# Patient Record
Sex: FEMALE | URBAN - METROPOLITAN AREA
[De-identification: names, ages, dates, MRNs, and addresses within clinical notes are randomized per-mention and may not be internally consistent; named-entity substitution may affect disease eponyms.]

---

## 2024-05-02 ENCOUNTER — TELEPHONE (OUTPATIENT)
Dept: UROLOGY | Facility: CLINIC | Age: 44
End: 2024-05-02

## 2024-05-06 NOTE — TELEPHONE ENCOUNTER
LVM for patient that we cancelled her appt w/ Dr. Gamino on 7/10.  3 attempts were made to obtain the ins info from the patient, and she did not call back.  Asked her to please call back when she has her insurance information, and we will reschedule her.

## 2024-06-19 ENCOUNTER — TELEPHONE (OUTPATIENT)
Dept: UROLOGY | Facility: CLINIC | Age: 44
End: 2024-06-19

## 2024-06-19 NOTE — TELEPHONE ENCOUNTER
Left reminder message about upcoming new patient appointment with Dr Gamino on 6/26/24 @ 3p. Reminded patient of our [place] address and to bring with new patient paperwork, photo id and insurance card. Also left office phone number in case patient needs to call us back.

## 2024-06-26 ENCOUNTER — OFFICE VISIT (OUTPATIENT)
Dept: UROLOGY | Facility: CLINIC | Age: 44
End: 2024-06-26
Attending: OBSTETRICS & GYNECOLOGY

## 2024-06-26 VITALS
DIASTOLIC BLOOD PRESSURE: 52 MMHG | RESPIRATION RATE: 18 BRPM | WEIGHT: 140.38 LBS | HEIGHT: 63.5 IN | BODY MASS INDEX: 24.57 KG/M2 | SYSTOLIC BLOOD PRESSURE: 92 MMHG | TEMPERATURE: 98 F

## 2024-06-26 DIAGNOSIS — N94.6 DYSMENORRHEA: ICD-10-CM

## 2024-06-26 DIAGNOSIS — N81.2 INCOMPLETE UTEROVAGINAL PROLAPSE: Primary | ICD-10-CM

## 2024-06-26 DIAGNOSIS — N92.0 MENORRHAGIA WITH REGULAR CYCLE: ICD-10-CM

## 2024-06-26 DIAGNOSIS — N39.3 FEMALE STRESS INCONTINENCE: ICD-10-CM

## 2024-06-26 DIAGNOSIS — N81.11 CYSTOCELE, MIDLINE: ICD-10-CM

## 2024-06-26 DIAGNOSIS — N81.6 RECTOCELE: ICD-10-CM

## 2024-06-26 PROCEDURE — 99212 OFFICE O/P EST SF 10 MIN: CPT

## 2024-06-26 RX ORDER — NORETHINDRONE ACETATE AND ETHINYL ESTRADIOL .02; 1 MG/1; MG/1
TABLET ORAL
COMMUNITY
Start: 2024-05-15 | End: 2025-05-15

## 2024-06-26 NOTE — PROGRESS NOTES
ID: Gail Block  : 1980  Date: 2024     Referred by Dr. Teague    Chief Complaint   Patient presents with    Prolapse     Ana, uui, planning hysterectomy for menorrhagia, and fibroids.       HPI:  43 year old female, G5, Vaginal deliveries x4, who presents for evaluation of urinary leaking which is longstanding.  Leas with running, coughing, sneezing, exercise.   Has frequency and nocturia.  Some UUI.  No prolapse symptoms.  No UTIs.  Had normal UA on 3/4/24.   Has been having heavy and painful menses. Pelvic MRI \"3.2 x 2.7 x 1.6 cm homogeneously hypointense structure  located posterior and lateral to the uterine fundus on the right, corresponding to hyperechoic and  shadowing mass seen in recent pelvic ultrasound of 2024. This lesion shows overall isointensity  in the fat saturated precontrast T1-weighted sequences without subsequent contrast enhancement. No  definite signal alteration based on diffusion-weighted sequences (DWI). Given the MRI and  sonographic characteristics, this lesion may represent a densely calcified pedunculated leiomyoma or  other benign pelvic mass of uncertain etiology\"  History of cervical dysplasia  Sees Dr. Teague and is planning hysterectomy  Had prolapse noted on exam with gyn.  Feels \"too much laxity\". Had 4th degree laceration at the time of forceps delivery and feels vaginal introitus is too wide.   Has coital incontinence.  No dyspareunia.  Currently on OCPs  Healthy otherwise  Works as a teacher. Would like to have surgery ASAP.    Urogynecology Summary:  Urogynecology Summary  Prolapse: No  ANA: Yes  Urge Incontinence: Yes  Nocturia Frequency: Greater than 4 (3-4)  Frequency: 1 - 2 hours  Incomplete emptying: Yes  Constipation: No  Wears pad day?: 1 (liner)  Wears Pad Night?: 1 (liner)  Activities are limited by UI/POP?: Yes (leaking)  Currently Sexually Active: Yes (leaks during intercourse)          HISTORY:  Past Medical History:    Impaired vision in both  eyes    Irritable bowel syndrome      Past Surgical History:   Procedure Laterality Date            No family history on file.   Social History     Socioeconomic History    Marital status:    Tobacco Use    Smoking status: Former     Current packs/day: 0.00     Types: Cigarettes     Quit date: 2020     Years since quittin.0    Smokeless tobacco: Never   Substance and Sexual Activity    Alcohol use: Never    Drug use: Never     Social Determinants of Health     Financial Resource Strain: Not on File (2023)    Received from [employer]     Financial Resource Strain     Financial Resource Strain: 0   Food Insecurity: Not on File (2023)    Received from [employer]     Food Insecurity     Food: 0   Transportation Needs: Not on File (2023)    Received from [employer]     Transportation Needs     Transportation: 0   Physical Activity: Not on File (2023)    Received from [employer]     Physical Activity     Physical Activity: 0   Stress: Not on File (2023)    Received from [employer]     Stress     Stress: 0   Social Connections: Not on File (2023)    Received from [employer]     Social Connections     Social Connections and Isolation: 0   Housing Stability: Not on File (2023)    Received from [employer]     Housing Stability     Housin        Allergies:  No Known Allergies    Medications:  Outpatient Medications Prior to Visit   Medication Sig Dispense Refill    Norethindrone Acet-Ethinyl Est 1-20 MG-MCG Oral Tab One tablet daily continuous dosing without taking hormone free week       No facility-administered medications prior to visit.       Review of Systems:    A comprehensive 12 point review of systems was completed.  Pertinent positives noted in the the HPI.  Denies CP  Denies SOB    Vitals:  BP 92/52   Temp 98.2 °F (36.8 °C) (Temporal)   Resp 18   Ht 63.5\"   Wt 140 lb 6.4 oz (63.7 kg)   BMI 24.48 kg/m²        GENERAL EXAM:  GENERAL:  Alert and oriented. Well-nourished, normally developed.   Thought and emotional status are appropriate, speech is understandable.  No acute distress.   HEAD: Normocephalic and atraumatic with normal hair distribution  LUNGS:  Normal respiratory effort.    ABDOMEN: Non tender to palpation, tone normal without rigidity or guarding, no masses present, no evidence of hernia.   EXTREMITIES:  Without edema, varicosities or lesions.   SKIN:  Warm and dry, with good color and turgor. No lesions.    PELVIC EXAM:  External Genitalia: Normal appearance for age. No atrophy, no lesions  Urethra: no atrophy, non  tender  Bladder:no fullness, non tender  Vagina: no atrophy, no lesions   Cervix: no bleeding, no lesions, non tender  Uterus: soft, mobile, +tender  Adnexa:no masses, non tender  Perineum: non tender  Anus: no hemorrhoids  Rectum: deferred.     PELVIS FLOOR NEUROMUSCULAR FUNCTION:  Strength:  3  Perineal Sensation:  Normal      PELVIC SUPPORT:  [place]:  1  Ant:  2  Post:  2  CST:  negative  UVJ: + hypermobile (cystourethrocele).    PELVIC ORGAN PROLAPSE ASSESSMENT SCALE (POP-Q):      Aa: 0 Ba: 0 C: -5   gh: 4.5 pb: 3 tvl: 9   Ap: 0 Bp: 0 D: -7         Impression/Plan:    ICD-10-CM    1. Incomplete uterovaginal prolapse  N81.2       2. Cystocele, midline  N81.11       3. Rectocele  N81.6       4. Female stress incontinence  N39.3       5. Menorrhagia with regular cycle  N92.0       6. Dysmenorrhea  N94.6           Discussion Items:   Nonsurgical and surgical treatments for Stress Urinary Incontinence  Nonsurgical and surgical treatments for POP  Discussed dietary and behavioral modification, discussed pharmacologic and nonpharmacologic mgmt options for urinary symptoms. Discussed dietary & weight management with potential improvements in symptoms with weight loss.    Diagnostic Items:  Urodynamics    Medications Discussed:  None    Treatment Plan, Non-surgical:   None    Treatment Plan, Surgical:   Interested in definitive surgical therapy.  Can be approached vaginally with hyst  (Dr. Teague), followed by repairs, sling. Agrees to proceed with preop urodynamic testing.     Pt verbalizes understanding of all above discussed information. Follow up after testing (phone visit OK).   information provided.       Natasha Gamino MD, FACOG, FACS  Female Pelvic Medicine and  Reconstructive Surgery (Urogynecology)  [phone] (Pager)

## 2024-06-28 ENCOUNTER — TELEPHONE (OUTPATIENT)
Dept: UROLOGY | Facility: CLINIC | Age: 44
End: 2024-06-28

## 2024-07-02 ENCOUNTER — APPOINTMENT (OUTPATIENT)
Dept: ADMINISTRATIVE | Facility: HOSPITAL | Age: 44
End: 2024-07-02
Payer: COMMERCIAL

## 2024-07-08 ENCOUNTER — TELEPHONE (OUTPATIENT)
Dept: UROLOGY | Facility: CLINIC | Age: 44
End: 2024-07-08

## 2024-07-09 ENCOUNTER — OFFICE VISIT (OUTPATIENT)
Dept: UROLOGY | Facility: CLINIC | Age: 44
End: 2024-07-09
Attending: OBSTETRICS & GYNECOLOGY
Payer: COMMERCIAL

## 2024-07-09 DIAGNOSIS — N81.6 RECTOCELE: ICD-10-CM

## 2024-07-09 DIAGNOSIS — N39.3 FEMALE STRESS INCONTINENCE: ICD-10-CM

## 2024-07-09 DIAGNOSIS — N81.2 INCOMPLETE UTEROVAGINAL PROLAPSE: Primary | ICD-10-CM

## 2024-07-09 DIAGNOSIS — N81.11 CYSTOCELE, MIDLINE: ICD-10-CM

## 2024-07-09 LAB
BLOOD URINE: NEGATIVE
CONTROL RUN WITHIN 24 HOURS?: YES
LEUKOCYTE ESTERASE URINE: NEGATIVE
NITRITE URINE: NEGATIVE

## 2024-07-09 PROCEDURE — 51797 INTRAABDOMINAL PRESSURE TEST: CPT

## 2024-07-09 PROCEDURE — 51741 ELECTRO-UROFLOWMETRY FIRST: CPT

## 2024-07-09 PROCEDURE — 51784 ANAL/URINARY MUSCLE STUDY: CPT

## 2024-07-09 PROCEDURE — 81002 URINALYSIS NONAUTO W/O SCOPE: CPT

## 2024-07-09 PROCEDURE — 51729 CYSTOMETROGRAM W/VP&UP: CPT

## 2024-07-09 NOTE — PATIENT INSTRUCTIONS
[facility]  [address] [address]  PH: [phone]  FAX: [phone]       Urodynamic Testing Discharge Instructions:    There are NO dietary or activity restrictions.  You may resume your normal schedule.      You may have mild discomfort for a few hours after your testing today.  There may be some mild burning when you urinate or you may see some blood in your urine.  These problems should not last more than 24 hours.  The following suggestions may minimize any symptoms you experience.    Drink 6-8 large glasses of water over the next 8 hours  A compress or sitz bath may be soothing  Tylenol or Ibuprofen may be taken as needed    If you experience any of the following, please call the office or, if after hours, the on-call physician at [phone].    Excessive pain  Bright red bloody discharge  Fever or chills  Continued urgency, frequency or burning with urination    Obtaining Test Results    Your urodynamic test will be interpreted by a specialist and available to the referring physician within 7-14 days.  Patients in our clinic are given an appointment to come back to discuss the results and any appropriate treatment recommendations.    Please do not hesitate to contact our office with any questions or concerns at [phone].    I acknowledge that I have received verbal and written discharge  instructions and that I understand these instructions clearly.    Patient Signature:    Date:    [facility]     Post-Operative Guidelines    AVOID CONSTIPATION - Take Miralax: one capful in water or juice each morning.  You can also take each evening if needed.  No lifting over 10 lbs. (1 gallon of milk is 8 lbs.)  It is okay to walk up and down stairs and to walk outside, but be careful not to become fatigued.  Showers and tub baths are okay, even if you have a catheter or abdominal incision.  You may ride in a  car immediately.  You may drive after 1-2 weeks.    Please call us for any of the following:  Temperature above 100.5 for 4 hours or above 101.0 at any time  Chest pain or trouble breathing  Vaginal bleeding heavier than a period  Redness, tenderness or swelling of your legs  Pain or burning when you urinate  Redness, pain or foul discharge from incision          [facility]    HAVING A URINARY CATHETER AFTER SURGERY    What is a urinary catheter?  A catheter is a soft tube used to drain urine from your bladder.    Why do I need a catheter?  A urinary catheter is used to help with healing immediately after surgery.  It works to keep your bladder empty while you are recovering.  Surgery, swelling and anesthesia can cause the bladder to lose its normal sensations for a while.  The catheter may stay in place for a week or more after you go home.    Where will I go to get the catheter removed?  Your catheter will be removed in the office.  Please call the office to schedule a voiding trial with the nurse.    How will the catheter be removed?  A nurse will disconnect your catheter from the drainage bag.  She will attach a syringe to the catheter and fill the bladder with sterile water until you have a strong desire to urinate.  The catheter will be removed and you will be able to go to the bathroom to empty your bladder.    Will the catheter need to be replaced?  You will need to urinate a specific amount, depending on how much you were initially able to hold.  Your catheter will only need to be replaced if you are not able to empty the specific amount.  If the catheter is replaced, your nurse will discuss with you when you need to return.    What do I do after the catheter is removed?  For the first 24 hours, you should go to the bathroom with your first urge or every 2-3 hours while you are awake.  Urinate before you go to bed and if you wake up in the night, you do not need to  set an alarm to wake up.  Drink plenty of water (6-8 glasses) slowly throughout the day.  Avoid liquids containing caffeine.  Continue with any pain medications (Motrin) as directed by the nurse.  Continue with your current bowel regime; avoid constipation.    What if I have trouble emptying my bladder?  If you are having trouble emptying your bladder, try the following tips:  Urinate normally then stand up, walk around for a minute or two, sit back down on the commode and attempt to urinate (double void).  Sit in a tub of warm water and try to urinate in the tub.  Run warm water over your vaginal area while attempting to urinate.    When should I call the office?  If you are unable to urinate for 6 hours.  You feel you need to urinate but cannot.  Urinating frequently in small amounts.  You experience abdominal bloating, pressure or back pain.  You have a fever over 100.5 for 4 hours or above 101.0 anytime.  You have nausea and/or vomiting.  Burning/pain with urination.    Please feel free to call the nurse at [phone] with any questions 8am-4:30pm Monday-Friday.    If the office is closed, you may call the on call physician at [phone] or go to the emergency room.

## 2024-07-09 NOTE — PROCEDURES
Patient here for urodynamic testing.  Procedure explained and confirmed by patient.  See evaluation form for results.  Both verbal and written discharge instructions were given.  Patient tolerated procedure well and will follow up with Dr. Natasha Gamino on phone on 24.    URODYNAMIC EVALUATION    PATIENT HISTORY:    Prolapse:  Yes  ANA:  Yes- reports that it is not often   UUI:  Yes - feels urgency and pressure on bladder , pelvic area - always \" feels like she has to go\"  Nocturia:  3 to 4 - wakes up with ure to urinate   Frequency:  every 1-2 hours  Sense of Incomplete Emptying:  No  Constipation:  No  Last void prior to UDS testin hours  Current urge to void?  Moderate  OAB meds stopped prior to test?  NA  Other symptoms?      Surgery?  []  No  []  Interested in surgery:   [x]  Yes, specify date: 24 TOTAL VAGINIAL HYSTERECTOMY, POSSIBLE BILATERAL SALPINGECTOMY, UTEROSACRAL LIGAMENT SUSPENSION, ANTERIOR POSTERIOR ENTEROCELE REPAIR, PLACEMENT OF MID URETHRAL SLING, CYSTOSCOPY     Surgical folder provided?  [x]  Yes IUG consent signed  at UDS []  No     PATIENT DIAGNOSIS:  Cystocele, Midline N81.11, Rectocele, Midline R15.9, Stress Incontinence N39.3, and Uterovaginal Prolapse N81.2 (incomplete)    UDS PROCEDURAL FINDINGS:  Urethral Hypermobility N36.41    MEDICATION:   No outpatient medications have been marked as taking for the 24 encounter (Office Visit) with LIS PROCEDURE.        ALLERGIES:  Patient has no known allergies.      EXAM:  Urinalysis Dip:  No visits with results within 1 Day(s) from this visit.   Latest known visit with results is:   No results found for any previous visit.      Urovesico [place] ( >30 degrees ):  [x]  Mobile  []  Fixed    Perineal Sensation:  [x]  Normal  []  Abnormal    Additional Notes:    PROLAPSE:  [x]  Yes  []  No  Prolapse reduced for testing?  [x]  Yes  []  No  []  Pessary  []  Manual  [x]  Half Speculum    Additional Notes:    UROFLOWMETRY:   Unreduced  Voided Volume:                      277       mL  Maximum Flow Rate:                         26       mL/sec  Average flow rate:                       13      mL/sec  Post-void Residual:                      40     mL  Pattern:  [x]  Normal  []  Poor flow     []  Intermittent  []  Other  Void:   [x]  Typical  []  Atypical    Additional Notes:    CYSTOMETRY:  Urethral Catheter:  Fr 7 / tdoc  Abd Catheter:     Fr 7 / tdoc   Infusion:  Water Rate 30 mL/min  Temp:  Room  Position:  [x]  Sit  []  Stand  []  Supine  First sensation:   78 mL  First desire to void:   115 mL  Strong desire to void:  275 mL  Maximum cystometric capacity:   315 mL uncomfortable with fill at halfway  Detrusor Activity:  []  Unstable   [x]  Stable  Urge leakage?    []  Yes [x]  No  Volume at 1st unhibited detrusor cont:    mL  Detrusor instability provoked by:    []  Spontaneous []  Coughing  []  Filling  []  Valsalva  []  Other    Additional Notes:      URETHRAL FUNCTION:  Valsava (vesical) Leak Point Pressures:    Volume Leak Point Pressure Leak?    Cough Valsalva      100mL 143 37    cm H2O []  Yes [x]  No         REDUCED:Half Speculum  Volume Leak Point Pressure Leak?    Cough Valsalva      100mL 143 35    cm H2O []  Yes [x]  No   200mL 149 35    cm H2O []  Yes [x]  No   275 mL 196 125    cm H2O []  Yes [x]  No   Maximum Cystometric Capacity  316 ml  Sitting upright 179 107 cm H2O []  Yes [x]  No     Genuine Stress Incontinence demonstrated?   []  Yes  [x]  No    Resting Urethral Pressure Profile:     Functional Urethral Length: manual pull  Maximum UCP:          110  cm          129    cm             119     cm    PRESSURE/FLOW STUDY:  Unreduced  Voided volume:       407 mL  Maximum flow rate:       10 mL/sec  Pressure Detrusor (at maximum flow):         48   cm H2O  Post void residual:        20       mL  Voiding mechanism:  [x]  Abnormal  []  Normal  [x]  Strain to void   []  Weak detrusor  Void:   [x]  Typical   []   Atypical    Additional Notes:  Uroflowmetry, cystometry, and pressure / flow study were completed using calibrated electronic equipment and air charged transducers.    EMG:  During fill: Reactive    During flow study: Reactive strain to void    7/9/2024 10:33 AM     PERFORMED BY:  Beckie SALVADRO RN

## 2024-07-16 ENCOUNTER — LAB ENCOUNTER (OUTPATIENT)
Dept: LAB | Age: 44
End: 2024-07-16
Attending: OBSTETRICS & GYNECOLOGY
Payer: COMMERCIAL

## 2024-07-16 DIAGNOSIS — N81.4 UTEROVAGINAL PROLAPSE: ICD-10-CM

## 2024-07-16 LAB
ERYTHROCYTE [DISTWIDTH] IN BLOOD BY AUTOMATED COUNT: 12.3 %
HCT VFR BLD AUTO: 41.3 %
HGB BLD-MCNC: 13.7 G/DL
MCH RBC QN AUTO: 31.1 PG (ref 26–34)
MCHC RBC AUTO-ENTMCNC: 33.2 G/DL (ref 31–37)
MCV RBC AUTO: 93.9 FL
PLATELET # BLD AUTO: 199 10(3)UL (ref 150–450)
RBC # BLD AUTO: 4.4 X10(6)UL
WBC # BLD AUTO: 7 X10(3) UL (ref 4–11)

## 2024-07-16 PROCEDURE — 36415 COLL VENOUS BLD VENIPUNCTURE: CPT

## 2024-07-16 PROCEDURE — 85027 COMPLETE CBC AUTOMATED: CPT

## 2024-07-17 ENCOUNTER — VIRTUAL PHONE E/M (OUTPATIENT)
Dept: UROLOGY | Facility: CLINIC | Age: 44
End: 2024-07-17
Attending: OBSTETRICS & GYNECOLOGY
Payer: COMMERCIAL

## 2024-07-17 DIAGNOSIS — N81.6 RECTOCELE: ICD-10-CM

## 2024-07-17 DIAGNOSIS — N81.2 INCOMPLETE UTEROVAGINAL PROLAPSE: Primary | ICD-10-CM

## 2024-07-17 DIAGNOSIS — N81.11 CYSTOCELE, MIDLINE: ICD-10-CM

## 2024-07-17 DIAGNOSIS — N39.3 FEMALE STRESS INCONTINENCE: ICD-10-CM

## 2024-07-17 DIAGNOSIS — N92.0 MENORRHAGIA WITH REGULAR CYCLE: ICD-10-CM

## 2024-07-17 DIAGNOSIS — N94.6 DYSMENORRHEA: ICD-10-CM

## 2024-07-17 NOTE — PROGRESS NOTES
ID: Gail Block  : 1980  Date: 24    This is a virtual/phone visit being conducted with patient's consent.  History collected via telephone.  MD interview conduction via telephone  The patient has provided verbal understanding of the co-insurance liability for the telephonic/video provider services.  Length of services provided: 20 minutes.       No chief complaint on file.      HPI:  43 year old female, G5, Vaginal deliveries x4, who was originally seen on 24. To summarize, she presented for evaluation of urinary leaking which is longstanding.  Leas with running, coughing, sneezing, exercise.   Has frequency and nocturia.  Some UUI.  No prolapse symptoms.  No UTIs.  Had normal UA on 3/4/24.   Has been having heavy and painful menses. Pelvic MRI \"3.2 x 2.7 x 1.6 cm homogeneously hypointense structure located posterior and lateral to the uterine fundus on the right, corresponding to hyperechoic and shadowing mass seen in recent pelvic ultrasound of 2024. This lesion shows overall isointensity  in the fat saturated precontrast T1-weighted sequences without subsequent contrast enhancement. No  definite signal alteration based on diffusion-weighted sequences (DWI). Given the MRI and  sonographic characteristics, this lesion may represent a densely calcified pedunculated leiomyoma or  other benign pelvic mass of uncertain etiology\"  History of cervical dysplasia. Status post LEEP in the past.   Had prolapse noted on exam with gyn.  Feels \"too much laxity\". Had 4th degree laceration at the time of forceps delivery and feels vaginal introitus is too wide.   Has coital incontinence.  No dyspareunia.  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    Initial urogyn exam demonstrated stage 2 cystocele, rectocele with uterine descent. + hypermobility. Interested in surgery. This is her follow up encounter after UDS.    Interval history:  Urodynamic testing  undergone without complication on 7/9/24.  Results reviewed with patient  277 ml void (40 ml PVR)  315 ml capacity (+sensory urgency).   Stable detrusor  No urodynamic stress urinary incontinence with and without prolapse reduced.   Pressure/flow study: Voided 407 ml with PVR of 20 ml.        Review of Systems:    A comprehensive 12 point review of systems was completed.  Pertinent positives noted in the the HPI.  Denies CP  Denies SOB    Vitals:  Unable to perform vitals or do physical as this is a virtual visit.  The patient sounds alert and oriented x 3, no acute distress. The patient is speaking in complete sentences without any conversational dyspnea or respiratory distress.        Impression:    ICD-10-CM    1. Incomplete uterovaginal prolapse  N81.2       2. Cystocele, midline  N81.11       3. Menorrhagia with regular cycle  N92.0       4. Dysmenorrhea  N94.6       5. Rectocele  N81.6       6. Female stress incontinence  N39.3             Plan:  Discussed with patient management options for her symptoms. She is bothered by prolapse symptoms, ANA, abnormal bleeding, dysmenorrhea.  Wants to proceed with definitive surgical therapy. Discussed findings on UDS and inability to document ANA. However, she has ANA symptoms and hypermobility on exam. Recommend to have sling done. She can be approached vaginally with TVH, possible BS, USLS, APE repairs, sling, cysto.     Thorough discussion of surgical risks, benefits, and alternatives including, but not limited to bleeding/clots, infection, injury to nearby organs (urethra, bladder, ureters, bowel, blood vessels), mesh erosion/exposure, persistent dyspareunia, worsening UUI, recurrent ANA, prolapse recurrence, voiding dysfunction, and pain. Possible need for additional surgeries to address any complications reviewed. Discussed pain mgmt and potential need for narcotics. Discussed addiction potential with narcotics. [place]  reviewed.  We reviewed hospital stay and  postoperative convalescence.  She understands she will need to go home with a catheter.    All questions answered. Pre-operative instructions reviewed. IULTD consent signed at last visit. She has been scheduled for surgery on 7/22/24 at [facility].       Dr. Natasha Gamino MD, FACOG, FACS  Female Pelvic Medicine and  Reconstructive Surgery (Urogynecology)  [phone] (Pager)

## 2024-07-21 NOTE — H&P
[facility] INPATIENT H&P   GYN ADMISSION NOTE    ADMISSION DATE:  7/22/24  ADMITTING PHYSICIAN:  Natasha Gamino MD  ATTENDING PHYSICIAN:  Natasha Gamino MD  PRIMARY CARE PHYSICIAN:  No primary care provider on file.  CODE STATUS:  No Order    CHIEF COMPLAINT/REASON FOR ADMISSION:  Vaginal prolapse, abnormal bleeding, incontinence.     HISTORY OF PRESENT ILLNESS:    Gail Block is a 43 year old,  G5, Vaginal deliveries x4, who presented for evaluation of urinary leaking which is longstanding.  Leas with running, coughing, sneezing, exercise.   Has frequency and nocturia.  Some UUI.  No prolapse symptoms.  No UTIs.  Had normal UA on 3/4/24.   Has been having heavy and painful menses. Pelvic MRI \"3.2 x 2.7 x 1.6 cm homogeneously hypointense structure located posterior and lateral to the uterine fundus on the right, corresponding to hyperechoic and shadowing mass seen in recent pelvic ultrasound of 6/7/2024. This lesion shows overall isointensity  in the fat saturated precontrast T1-weighted sequences without subsequent contrast enhancement. No  definite signal alteration based on diffusion-weighted sequences (DWI). Given the MRI and  sonographic characteristics, this lesion may represent a densely calcified pedunculated leiomyoma or  other benign pelvic mass of uncertain etiology\"  History of cervical dysplasia. Status post LEEP in the past.   Had prolapse noted on exam with gyn.  Feels \"too much laxity\". Had 4th degree laceration at the time of forceps delivery and feels vaginal introitus is too wide.   Has coital incontinence.  No dyspareunia.  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     Initial urogyn exam demonstrated stage 2 cystocele, rectocele with uterine descent. + hypermobility. Interested in surgery.     Had Urodynamic testing on 7/9/24. Had 315 ml capacity (+sensory urgency). Stable detrusor. No urodynamic stress urinary incontinence with and  without prolapse reduced.  Pressure/flow study: Voided 407 ml with PVR of 20 ml.     MEDICATIONS PRIOR TO ADMISSION:      Current Outpatient Medications:     Norethindrone Acet-Ethinyl Est 1-20 MG-MCG Oral Tab, One tablet daily continuous dosing without taking hormone free week, Disp: , Rfl:        ALLERGIES:    No Known Allergies    PAST MEDICAL HISTORY:    Past Medical History:    Anxiety state    Attention deficit hyperactivity disorder (ADHD)    Cancer (HCC)    Cervical cancer (HCC)    Depression    IBS (irritable bowel syndrome)    Impaired vision in both eyes    Irritable bowel syndrome    Visual impairment    contacts and glasses       SURGICAL HISTORY:    Past Surgical History:   Procedure Laterality Date    Colonoscopy               FAMILY HISTORY:    History reviewed. No pertinent family history.    SOCIAL HISTORY:    Social History     Socioeconomic History    Marital status:    Tobacco Use    Smoking status: Former     Current packs/day: 0.00     Types: Cigarettes     Quit date: 2020     Years since quittin.0    Smokeless tobacco: Never   Vaping Use    Vaping status: Never Used   Substance and Sexual Activity    Alcohol use: Never    Drug use: Never     Social Determinants of Health     Financial Resource Strain: Not on File (2023)    Received from [facility]    Financial Resource Strain     Financial Resource Strain: 0   Food Insecurity: Not on File (2023)    Received from [facility]    Food Insecurity     Food: 0   Transportation Needs: Not on File (2023)    Received from [facility]    Transportation Needs     Transportation: 0   Physical Activity: Not on File (2023)    Received from [facility]    Physical Activity     Physical Activity: 0   Stress: Not on File (2023)    Received from [facility]    Stress     Stress: 0   Social Connections: Not on File (2023)    Received from [facility]    Social Connections     Social Connections and Isolation: 0   Housing Stability: Not on File  (2023)    Received from [facility]    [facility] Stability     Housin       REVIEW OF SYSTEMS:    As per HPI. Denies CP or SOB    OBJECTIVE:  VITAL SIGNS:    Ht 64\"   Wt 138 lb (62.6 kg)   LMP 2024 (Approximate)   BMI 23.69 kg/m²      PHYSICAL EXAMINATION:  GENERAL EXAM:  GENERAL:  Alert and oriented. Well-nourished, normally developed.  Thought and emotional status are appropriate, speech is understandable.  No acute distress.   HEAD: Normocephalic and atraumatic with normal hair distribution  LUNGS:  Normal respiratory effort.    ABDOMEN: Non tender to palpation, tone normal without rigidity or guarding, no masses present, no evidence of hernia.   EXTREMITIES:  Without edema, varicosities or lesions.   SKIN:  Warm and dry, with good color and turgor. No lesions.     PELVIC EXAM:  External Genitalia: Normal appearance for age. No atrophy, no lesions  Urethra: no atrophy, non  tender  Bladder:no fullness, non tender  Vagina: no atrophy, no lesions   Cervix: no bleeding, no lesions, non tender  Uterus: soft, mobile, +tender  Adnexa:no masses, non tender  Perineum: non tender  Anus: no hemorrhoids  Rectum: deferred.      PELVIS FLOOR NEUROMUSCULAR FUNCTION:  Strength:  3  Perineal Sensation:  Normal        PELVIC SUPPORT:  [place]:  1  Ant:  2  Post:  2  CST:  negative  UVJ: + hypermobile (cystourethrocele).     PELVIC ORGAN PROLAPSE ASSESSMENT SCALE (POP-Q):                                          Aa: 0 Ba: 0 C: -5   gh: 4.5 pb: 3 tvl: 9   Ap: 0 Bp: 0 D: -7           ASSESSMENT:    Uterovaginal prolapse, cystocele, rectocele  Stress urinary incontinence  Menorrhagia, dysmenorreha    PLAN:    Discussed with patient management options for her symptoms. She is bothered by prolapse symptoms, ANA, abnormal bleeding, dysmenorrhea.  Wants to proceed with definitive surgical therapy. Discussed findings on UDS and inability to document ANA. However, she has ANA symptoms and hypermobility on exam. Recommend to have  sling done. She can be approached vaginally with TVH, possible BS, USLS, APE repairs, sling, cysto.      Thorough discussion of surgical risks, benefits, and alternatives including, but not limited to bleeding/clots, infection, injury to nearby organs (urethra, bladder, ureters, bowel, blood vessels), mesh erosion/exposure, persistent dyspareunia, worsening UUI, recurrent ANA, prolapse recurrence, voiding dysfunction, and pain. Possible need for additional surgeries to address any complications reviewed. Discussed pain mgmt and potential need for narcotics. Discussed addiction potential with narcotics. IL  reviewed.  We reviewed hospital stay and postoperative convalescence.  She understands she will need to go home with a catheter.     All questions answered. Pre-operative instructions reviewed. IULTD consent signed at last visit. She has been scheduled for surgery on 7/22/24 at [facility].        Natasha Gamino MD   7/21/2024

## 2024-07-22 ENCOUNTER — ANESTHESIA EVENT (OUTPATIENT)
Dept: SURGERY | Facility: HOSPITAL | Age: 44
End: 2024-07-22
Payer: COMMERCIAL

## 2024-07-22 ENCOUNTER — ANESTHESIA (OUTPATIENT)
Dept: SURGERY | Facility: HOSPITAL | Age: 44
End: 2024-07-22
Payer: COMMERCIAL

## 2024-07-22 ENCOUNTER — HOSPITAL ENCOUNTER (OUTPATIENT)
Facility: HOSPITAL | Age: 44
Discharge: HOME | End: 2024-07-23
Attending: OBSTETRICS & GYNECOLOGY | Admitting: OBSTETRICS & GYNECOLOGY
Payer: COMMERCIAL

## 2024-07-22 DIAGNOSIS — Z98.890 POSTOPERATIVE STATE: ICD-10-CM

## 2024-07-22 DIAGNOSIS — N81.4 UTEROVAGINAL PROLAPSE: Primary | ICD-10-CM

## 2024-07-22 LAB — B-HCG UR QL: NEGATIVE

## 2024-07-22 PROCEDURE — 0UT97ZZ RESECTION OF UTERUS, VIA NATURAL OR ARTIFICIAL OPENING: ICD-10-PCS | Performed by: OBSTETRICS & GYNECOLOGY

## 2024-07-22 PROCEDURE — 0USG0ZZ REPOSITION VAGINA, OPEN APPROACH: ICD-10-PCS | Performed by: OBSTETRICS & GYNECOLOGY

## 2024-07-22 PROCEDURE — 0JQC0ZZ REPAIR PELVIC REGION SUBCUTANEOUS TISSUE AND FASCIA, OPEN APPROACH: ICD-10-PCS | Performed by: OBSTETRICS & GYNECOLOGY

## 2024-07-22 PROCEDURE — 81025 URINE PREGNANCY TEST: CPT

## 2024-07-22 PROCEDURE — 88305 TISSUE EXAM BY PATHOLOGIST: CPT | Performed by: OBSTETRICS & GYNECOLOGY

## 2024-07-22 PROCEDURE — 0UT77ZZ RESECTION OF BILATERAL FALLOPIAN TUBES, VIA NATURAL OR ARTIFICIAL OPENING: ICD-10-PCS | Performed by: OBSTETRICS & GYNECOLOGY

## 2024-07-22 PROCEDURE — 0TSD0ZZ REPOSITION URETHRA, OPEN APPROACH: ICD-10-PCS | Performed by: OBSTETRICS & GYNECOLOGY

## 2024-07-22 DEVICE — TRANSVAGINAL MID-URETHRAL SLING
Type: IMPLANTABLE DEVICE | Site: VAGINA | Status: FUNCTIONAL
Brand: ADVANTAGE FIT™ BLUE SYSTEM

## 2024-07-22 RX ORDER — PROCHLORPERAZINE EDISYLATE 5 MG/ML
5 INJECTION INTRAMUSCULAR; INTRAVENOUS EVERY 8 HOURS PRN
Status: DISCONTINUED | OUTPATIENT
Start: 2024-07-22 | End: 2024-07-22 | Stop reason: HOSPADM

## 2024-07-22 RX ORDER — IBUPROFEN 200 MG
600 TABLET ORAL ONCE AS NEEDED
Status: DISCONTINUED | OUTPATIENT
Start: 2024-07-22 | End: 2024-07-22 | Stop reason: HOSPADM

## 2024-07-22 RX ORDER — KETOROLAC TROMETHAMINE 30 MG/ML
INJECTION, SOLUTION INTRAMUSCULAR; INTRAVENOUS AS NEEDED
Status: DISCONTINUED | OUTPATIENT
Start: 2024-07-22 | End: 2024-07-22 | Stop reason: SURG

## 2024-07-22 RX ORDER — ONDANSETRON 2 MG/ML
4 INJECTION INTRAMUSCULAR; INTRAVENOUS EVERY 6 HOURS PRN
Status: DISCONTINUED | OUTPATIENT
Start: 2024-07-22 | End: 2024-07-22 | Stop reason: HOSPADM

## 2024-07-22 RX ORDER — HYDROMORPHONE HYDROCHLORIDE 1 MG/ML
0.6 INJECTION, SOLUTION INTRAMUSCULAR; INTRAVENOUS; SUBCUTANEOUS EVERY 5 MIN PRN
Status: DISCONTINUED | OUTPATIENT
Start: 2024-07-22 | End: 2024-07-22 | Stop reason: HOSPADM

## 2024-07-22 RX ORDER — MIDAZOLAM HYDROCHLORIDE 1 MG/ML
1 INJECTION INTRAMUSCULAR; INTRAVENOUS ONCE
Status: COMPLETED | OUTPATIENT
Start: 2024-07-22 | End: 2024-07-22

## 2024-07-22 RX ORDER — MIDAZOLAM HYDROCHLORIDE 1 MG/ML
INJECTION INTRAMUSCULAR; INTRAVENOUS AS NEEDED
Status: DISCONTINUED | OUTPATIENT
Start: 2024-07-22 | End: 2024-07-22 | Stop reason: SURG

## 2024-07-22 RX ORDER — PROCHLORPERAZINE EDISYLATE 5 MG/ML
10 INJECTION INTRAMUSCULAR; INTRAVENOUS EVERY 6 HOURS PRN
Status: DISCONTINUED | OUTPATIENT
Start: 2024-07-22 | End: 2024-07-23

## 2024-07-22 RX ORDER — PROCHLORPERAZINE MALEATE 10 MG
10 TABLET ORAL EVERY 12 HOURS PRN
Status: DISCONTINUED | OUTPATIENT
Start: 2024-07-22 | End: 2024-07-23

## 2024-07-22 RX ORDER — ONDANSETRON 4 MG/1
4 TABLET, FILM COATED ORAL EVERY 8 HOURS PRN
Status: DISCONTINUED | OUTPATIENT
Start: 2024-07-22 | End: 2024-07-23

## 2024-07-22 RX ORDER — HYDROMORPHONE HYDROCHLORIDE 1 MG/ML
0.2 INJECTION, SOLUTION INTRAMUSCULAR; INTRAVENOUS; SUBCUTANEOUS EVERY 5 MIN PRN
Status: DISCONTINUED | OUTPATIENT
Start: 2024-07-22 | End: 2024-07-22 | Stop reason: HOSPADM

## 2024-07-22 RX ORDER — HYDROCODONE BITARTRATE AND ACETAMINOPHEN 5; 325 MG/1; MG/1
1 TABLET ORAL ONCE AS NEEDED
Status: DISCONTINUED | OUTPATIENT
Start: 2024-07-22 | End: 2024-07-22 | Stop reason: HOSPADM

## 2024-07-22 RX ORDER — HYDROMORPHONE HYDROCHLORIDE 1 MG/ML
INJECTION, SOLUTION INTRAMUSCULAR; INTRAVENOUS; SUBCUTANEOUS
Status: COMPLETED
Start: 2024-07-22 | End: 2024-07-22

## 2024-07-22 RX ORDER — ROCURONIUM BROMIDE 10 MG/ML
INJECTION, SOLUTION INTRAVENOUS AS NEEDED
Status: DISCONTINUED | OUTPATIENT
Start: 2024-07-22 | End: 2024-07-22 | Stop reason: SURG

## 2024-07-22 RX ORDER — DEXAMETHASONE SODIUM PHOSPHATE 4 MG/ML
VIAL (ML) INJECTION AS NEEDED
Status: DISCONTINUED | OUTPATIENT
Start: 2024-07-22 | End: 2024-07-22 | Stop reason: SURG

## 2024-07-22 RX ORDER — SODIUM CHLORIDE, SODIUM LACTATE, POTASSIUM CHLORIDE, CALCIUM CHLORIDE 600; 310; 30; 20 MG/100ML; MG/100ML; MG/100ML; MG/100ML
INJECTION, SOLUTION INTRAVENOUS CONTINUOUS
Status: DISCONTINUED | OUTPATIENT
Start: 2024-07-22 | End: 2024-07-22 | Stop reason: HOSPADM

## 2024-07-22 RX ORDER — ACETAMINOPHEN 10 MG/ML
INJECTION, SOLUTION INTRAVENOUS
Status: COMPLETED
Start: 2024-07-22 | End: 2024-07-22

## 2024-07-22 RX ORDER — ACETAMINOPHEN 500 MG
1000 TABLET ORAL ONCE
Status: DISCONTINUED | OUTPATIENT
Start: 2024-07-22 | End: 2024-07-22 | Stop reason: HOSPADM

## 2024-07-22 RX ORDER — MIDAZOLAM HYDROCHLORIDE 1 MG/ML
INJECTION INTRAMUSCULAR; INTRAVENOUS
Status: COMPLETED
Start: 2024-07-22 | End: 2024-07-22

## 2024-07-22 RX ORDER — LIDOCAINE HYDROCHLORIDE 10 MG/ML
INJECTION, SOLUTION EPIDURAL; INFILTRATION; INTRACAUDAL; PERINEURAL AS NEEDED
Status: DISCONTINUED | OUTPATIENT
Start: 2024-07-22 | End: 2024-07-22 | Stop reason: SURG

## 2024-07-22 RX ORDER — SODIUM CHLORIDE, SODIUM LACTATE, POTASSIUM CHLORIDE, CALCIUM CHLORIDE 600; 310; 30; 20 MG/100ML; MG/100ML; MG/100ML; MG/100ML
INJECTION, SOLUTION INTRAVENOUS CONTINUOUS
Status: DISCONTINUED | OUTPATIENT
Start: 2024-07-22 | End: 2024-07-22

## 2024-07-22 RX ORDER — ACETAMINOPHEN 10 MG/ML
1000 INJECTION, SOLUTION INTRAVENOUS ONCE
Status: COMPLETED | OUTPATIENT
Start: 2024-07-22 | End: 2024-07-22

## 2024-07-22 RX ORDER — PROCHLORPERAZINE 25 MG
25 SUPPOSITORY, RECTAL RECTAL EVERY 12 HOURS PRN
Status: DISCONTINUED | OUTPATIENT
Start: 2024-07-22 | End: 2024-07-23

## 2024-07-22 RX ORDER — ONDANSETRON 2 MG/ML
4 INJECTION INTRAMUSCULAR; INTRAVENOUS EVERY 8 HOURS PRN
Status: DISCONTINUED | OUTPATIENT
Start: 2024-07-22 | End: 2024-07-23

## 2024-07-22 RX ORDER — ONDANSETRON 2 MG/ML
INJECTION INTRAMUSCULAR; INTRAVENOUS AS NEEDED
Status: DISCONTINUED | OUTPATIENT
Start: 2024-07-22 | End: 2024-07-22 | Stop reason: SURG

## 2024-07-22 RX ORDER — KETOROLAC TROMETHAMINE 30 MG/ML
30 INJECTION, SOLUTION INTRAMUSCULAR; INTRAVENOUS EVERY 6 HOURS
Status: DISCONTINUED | OUTPATIENT
Start: 2024-07-22 | End: 2024-07-23

## 2024-07-22 RX ORDER — SODIUM CHLORIDE, SODIUM LACTATE, POTASSIUM CHLORIDE, CALCIUM CHLORIDE 600; 310; 30; 20 MG/100ML; MG/100ML; MG/100ML; MG/100ML
INJECTION, SOLUTION INTRAVENOUS CONTINUOUS
Status: DISCONTINUED | OUTPATIENT
Start: 2024-07-22 | End: 2024-07-23

## 2024-07-22 RX ORDER — HYDROMORPHONE HYDROCHLORIDE 1 MG/ML
0.4 INJECTION, SOLUTION INTRAMUSCULAR; INTRAVENOUS; SUBCUTANEOUS EVERY 2 HOUR PRN
Status: DISCONTINUED | OUTPATIENT
Start: 2024-07-22 | End: 2024-07-23

## 2024-07-22 RX ORDER — TRAMADOL HYDROCHLORIDE 50 MG/1
50 TABLET ORAL EVERY 6 HOURS PRN
Status: DISCONTINUED | OUTPATIENT
Start: 2024-07-22 | End: 2024-07-23

## 2024-07-22 RX ORDER — HYDROMORPHONE HYDROCHLORIDE 1 MG/ML
0.8 INJECTION, SOLUTION INTRAMUSCULAR; INTRAVENOUS; SUBCUTANEOUS EVERY 2 HOUR PRN
Status: DISCONTINUED | OUTPATIENT
Start: 2024-07-22 | End: 2024-07-23

## 2024-07-22 RX ORDER — HYDROMORPHONE HYDROCHLORIDE 1 MG/ML
0.4 INJECTION, SOLUTION INTRAMUSCULAR; INTRAVENOUS; SUBCUTANEOUS EVERY 5 MIN PRN
Status: DISCONTINUED | OUTPATIENT
Start: 2024-07-22 | End: 2024-07-22 | Stop reason: HOSPADM

## 2024-07-22 RX ORDER — ACETAMINOPHEN 500 MG
1000 TABLET ORAL ONCE AS NEEDED
Status: DISCONTINUED | OUTPATIENT
Start: 2024-07-22 | End: 2024-07-22 | Stop reason: HOSPADM

## 2024-07-22 RX ORDER — OXYCODONE HYDROCHLORIDE 10 MG/1
10 TABLET ORAL EVERY 4 HOURS PRN
Status: DISCONTINUED | OUTPATIENT
Start: 2024-07-22 | End: 2024-07-23

## 2024-07-22 RX ORDER — DIPHENHYDRAMINE HYDROCHLORIDE 50 MG/ML
12.5 INJECTION INTRAMUSCULAR; INTRAVENOUS EVERY 4 HOURS PRN
Status: DISCONTINUED | OUTPATIENT
Start: 2024-07-22 | End: 2024-07-23

## 2024-07-22 RX ORDER — OXYCODONE HYDROCHLORIDE 5 MG/1
5 TABLET ORAL EVERY 4 HOURS PRN
Status: DISCONTINUED | OUTPATIENT
Start: 2024-07-22 | End: 2024-07-23

## 2024-07-22 RX ORDER — NALOXONE HYDROCHLORIDE 0.4 MG/ML
0.08 INJECTION, SOLUTION INTRAMUSCULAR; INTRAVENOUS; SUBCUTANEOUS AS NEEDED
Status: DISCONTINUED | OUTPATIENT
Start: 2024-07-22 | End: 2024-07-22 | Stop reason: HOSPADM

## 2024-07-22 RX ORDER — ENOXAPARIN SODIUM 100 MG/ML
40 INJECTION SUBCUTANEOUS DAILY
Status: DISCONTINUED | OUTPATIENT
Start: 2024-07-23 | End: 2024-07-23

## 2024-07-22 RX ORDER — BUPIVACAINE HYDROCHLORIDE AND EPINEPHRINE 2.5; 5 MG/ML; UG/ML
INJECTION, SOLUTION EPIDURAL; INFILTRATION; INTRACAUDAL; PERINEURAL AS NEEDED
Status: DISCONTINUED | OUTPATIENT
Start: 2024-07-22 | End: 2024-07-22 | Stop reason: HOSPADM

## 2024-07-22 RX ORDER — HYDROCODONE BITARTRATE AND ACETAMINOPHEN 5; 325 MG/1; MG/1
2 TABLET ORAL ONCE AS NEEDED
Status: DISCONTINUED | OUTPATIENT
Start: 2024-07-22 | End: 2024-07-22 | Stop reason: HOSPADM

## 2024-07-22 RX ADMIN — KETOROLAC TROMETHAMINE 15 MG: 30 INJECTION, SOLUTION INTRAMUSCULAR; INTRAVENOUS at 09:20:00

## 2024-07-22 RX ADMIN — LIDOCAINE HYDROCHLORIDE 25 MG: 10 INJECTION, SOLUTION EPIDURAL; INFILTRATION; INTRACAUDAL; PERINEURAL at 07:35:00

## 2024-07-22 RX ADMIN — MIDAZOLAM HYDROCHLORIDE 2 MG: 1 INJECTION INTRAMUSCULAR; INTRAVENOUS at 07:33:00

## 2024-07-22 RX ADMIN — ONDANSETRON 4 MG: 2 INJECTION INTRAMUSCULAR; INTRAVENOUS at 07:42:00

## 2024-07-22 RX ADMIN — SODIUM CHLORIDE, SODIUM LACTATE, POTASSIUM CHLORIDE, CALCIUM CHLORIDE: 600; 310; 30; 20 INJECTION, SOLUTION INTRAVENOUS at 09:49:00

## 2024-07-22 RX ADMIN — DEXAMETHASONE SODIUM PHOSPHATE 8 MG: 4 MG/ML VIAL (ML) INJECTION at 07:42:00

## 2024-07-22 RX ADMIN — ROCURONIUM BROMIDE 30 MG: 10 INJECTION, SOLUTION INTRAVENOUS at 07:42:00

## 2024-07-22 RX ADMIN — SODIUM CHLORIDE, SODIUM LACTATE, POTASSIUM CHLORIDE, CALCIUM CHLORIDE: 600; 310; 30; 20 INJECTION, SOLUTION INTRAVENOUS at 07:42:00

## 2024-07-22 RX ADMIN — ROCURONIUM BROMIDE 10 MG: 10 INJECTION, SOLUTION INTRAVENOUS at 07:36:00

## 2024-07-22 NOTE — PLAN OF CARE
Pain better controlled this evening. Patient more comfortable. Encouraged to get out of bed/ambulate/chair, declined for now. Will try to order some food.

## 2024-07-22 NOTE — PLAN OF CARE
Received from PACU earlier.   Alert, awake. Crying, throwing self around bed, c/o severe pelvic pain. C/o nausea. Given dilaudid/oxy/toradol with no relief per patient report. Given zofran for nausea. Patient asking about eating. Encouraged to not eat/drink until nausea resolved. Drank a few cups of water and some crackers. Did vomit clear liquid over an hour after taking oxy. Patient stating that she did not get the effect of the oxy as she vomited and requesting another dose. Patient stating that she does not want any more dilaudid as it just makes her loopy and does not help with pain. Called Dr. Gamino and discussed. Tramadol/compazine ordered.

## 2024-07-22 NOTE — DISCHARGE INSTRUCTIONS
Pelvic Medicine Postoperative Instructions:    PAIN CONTROL  Take Ibuprofen every 6 hours on a schedule.  If you need more pain medication, you can take either Norco or Tramadol - these are also taken every 6 hours, but you should take one of these in between your doses of ibuprofen (so that you are getting pain medication every 3 hours, but alternating between ibuprofen and Norco or Tramadol)    1. AVOID CONSTIPATION:   -Take Miralax one capful in water or juice each morning.    -Take Fiber supplement along with Miralax as well.  These can be mixed together in the same glass of liquid.  -On any day that you don't have a bowel movement, you can take 1-2 teaspoons of Milk of Magnesia that evening   2. No lifting over 10 pounds (1 gallon of milk is 8 pounds).  3. Showers and tub baths are okay, even if you have a catheter or abdominal incision.  4. You may ride in a car immediately.  5. You may drive after 1-2 weeks as long as you are not taking any narcotic pain medications    Please call us for any of the following:  -Temperature above 100.5 for 4 hours or above 101.0 at any time.  -Chest pain or trouble breathing.  -Vaginal bleeding heavier than a period.  -Redness, tenderness or swelling of your legs  -Pain or burning when you urinate; or if you go home with a catheter, trouble with catheter draining.  -Redness, pain or foul discharge from incision.    Office telephone: [phone]  After hours: [phone]

## 2024-07-22 NOTE — ANESTHESIA POSTPROCEDURE EVALUATION
[facility]    Gail Block Patient Status:  Outpatient in a Bed   Age/Gender 43 year old female MRN [id number]   Location [facility] SURGERY Attending Natasha Gamino MD   Hosp Day # 0 PCP No primary care provider on file.       Anesthesia Post-op Note    TOTAL VAGINIAL HYSTERECTOMY, BILATERAL SALPINGECTOMY, UTEROSACRAL LIGAMENT SUSPENSION, ANTERIOR/ POSTERIOR/ENTEROCELE REPAIR, PLACEMENT OF MID URETHRAL SLING, CYSTOSCOPY    Procedure Summary       Date: 07/22/24 Room / Location:  MAIN OR 04 /  MAIN OR    Anesthesia Start: 0732 Anesthesia Stop:     Procedure: TOTAL VAGINIAL HYSTERECTOMY, BILATERAL SALPINGECTOMY, UTEROSACRAL LIGAMENT SUSPENSION, ANTERIOR/ POSTERIOR/ENTEROCELE REPAIR, PLACEMENT OF MID URETHRAL SLING, CYSTOSCOPY (Vagina ) Diagnosis: (CYSTOCELE,)    Surgeons: Natasha Gamino MD Anesthesiologist: Abhinav Fish MD    Anesthesia Type: general ASA Status: 2            Anesthesia Type: No value filed.    Vitals Value Taken Time   / 07/22/24 0949   Temp 97 07/22/24 0949   Pulse 89 07/22/24 0949   Resp 22 07/22/24 0949   SpO2 99 07/22/24 0949       Patient Location: PACU    Anesthesia Type: general    Airway Patency: patent    Postop Pain Control: inadequate, being treated    Mental Status: mildly sedated but able to meaningfully participate in the post-anesthesia evaluation    Nausea/Vomiting: none    Cardiopulmonary/Hydration status: stable euvolemic    Complications: no apparent anesthesia related complications    Postop vital signs: stable    Dental Exam: Unchanged from Preop    Patient to be discharged from PACU when criteria met.

## 2024-07-22 NOTE — ANESTHESIA PREPROCEDURE EVALUATION
PRE-OP EVALUATION    Patient Name: Gail Block    Admit Diagnosis: CYSTOCELE,    Pre-op Diagnosis: CYSTOCELE,    TOTAL VAGINIAL HYSTERECTOMY, POSSIBLE BILATERAL SALPINGECTOMY, UTEROSACRAL LIGAMENT SUSPENSION, ANTERIOR/ POSTERIOR/ENTEROCELE REPAIR, PLACEMENT OF MID URETHRAL SLING, CYSTOSCOPY    Anesthesia Procedure: TOTAL VAGINIAL HYSTERECTOMY, POSSIBLE BILATERAL SALPINGECTOMY, UTEROSACRAL LIGAMENT SUSPENSION, ANTERIOR/ POSTERIOR/ENTEROCELE REPAIR, PLACEMENT OF MID URETHRAL SLING, CYSTOSCOPY    Surgeons and Role:     * Natasha Gamino MD - Primary    Pre-op vitals reviewed.  Temp: 98 °F (36.7 °C)  Pulse: 83  Resp: 16  BP: 110/76  SpO2: 100 %  Body mass index is 24.07 kg/m².    Current medications reviewed.  Hospital Medications:   acetaminophen (Tylenol Extra Strength) tab 1,000 mg  1,000 mg Oral Once    lactated ringers infusion   Intravenous Continuous    ceFAZolin (Ancef) 2g in 10mL IV syringe premix  2 g Intravenous Once       Outpatient Medications:     No medications prior to admission.       Allergies: Patient has no known allergies.      Anesthesia Evaluation    Patient summary reviewed.    Anesthetic Complications           GI/Hepatic/Renal    Negative GI/hepatic/renal ROS.                             Cardiovascular    Negative cardiovascular ROS.                                                   Endo/Other      (-) diabetes                            Pulmonary    Negative pulmonary ROS.                       Neuro/Psych      (+) depression                                Past Surgical History:   Procedure Laterality Date    Colonoscopy             Social History     Socioeconomic History    Marital status:    Tobacco Use    Smoking status: Former     Current packs/day: 0.00     Types: Cigarettes     Quit date: 2020     Years since quittin.0    Smokeless tobacco: Never   Vaping Use    Vaping status: Never Used   Substance and Sexual Activity    Alcohol use: Never    Drug use: Never      History   Drug Use Unknown     Available pre-op labs reviewed.  Lab Results   Component Value Date    WBC 7.0 07/16/2024    RBC 4.40 07/16/2024    HGB 13.7 07/16/2024    HCT 41.3 07/16/2024    MCV 93.9 07/16/2024    MCH 31.1 07/16/2024    MCHC 33.2 07/16/2024    RDW 12.3 07/16/2024    .0 07/16/2024               Airway      Mallampati: II       Cardiovascular    Cardiovascular exam normal.         Dental    Dentition appears grossly intact         Pulmonary    Pulmonary exam normal.  Breath sounds clear to auscultation bilaterally.               Other findings              ASA: 2   Plan: general  NPO status verified and           Plan/risks discussed with: patient                Present on Admission:   Incomplete uterovaginal prolapse   Cystocele, midline   Rectocele   Menorrhagia with regular cycle   Dysmenorrhea   Female stress incontinence

## 2024-07-22 NOTE — INTERVAL H&P NOTE
Pre-op Diagnosis: CYSTOCELE,    The above referenced H&P was reviewed by Natasha Gamino MD on 7/22/2024, the patient was examined and no significant changes have occurred in the patient's condition since the H&P was performed.  I discussed with the patient and/or legal representative the potential benefits, risks and side effects of this procedure; the likelihood of the patient achieving goals; and potential problems that might occur during recuperation.  I discussed reasonable alternatives to the procedure, including risks, benefits and side effects related to the alternatives and risks related to not receiving this procedure.  We will proceed with procedure as planned.

## 2024-07-22 NOTE — ANESTHESIA PROCEDURE NOTES
Airway  Date/Time: 7/22/2024 7:38 AM  Urgency: elective      General Information and Staff    Patient location during procedure: OR  Anesthesiologist: Abhinav Fish MD  Performed: anesthesiologist   Performed by: Abhinav Fish MD  Authorized by: Abhinav Fish MD      Indications and Patient Condition  Indications for airway management: anesthesia  Sedation level: deep  Preoxygenated: yes  Patient position: sniffing  Mask difficulty assessment: 1 - vent by mask    Final Airway Details  Final airway type: endotracheal airway      Successful airway: ETT  Cuffed: yes   Successful intubation technique: direct laryngoscopy  Endotracheal tube insertion site: oral  Blade: [place]  Blade size: #4  ETT size (mm): 7.0    Placement verified by: capnometry   Measured from: lips  Number of attempts at approach: 1

## 2024-07-22 NOTE — OPERATIVE REPORT
Gail Block Rosa M LOUISE.   : 1980  MRN: [id number]  Date of Surgery: 2024     Pre-operative diagnosis:  1. Incomplete uterovaginal prolapse, cystocele, rectocele, enterocele  2. Stress urinary incontinence.  3. Menorrhagia, dysmenorrhea.     Post-operative diagnosis:  1. Incomplete uterovaginal prolapse, cystocele, rectocele, enterocele  2. Stress urinary incontinence.  3. Menorrhagia, dysmenorrhea     Procedures:    Total vaginal hysterectomy, bilateral salpingectomy, repair of enterocele; bilateral uterosacral ligament suspension; anterior colporrhaphy; posterior colporrhaphy; retropubic midurethral sling, cystoscopy.     Surgeon:  Natasha Gamino MD     Assistant:  Destiney Velásquez CSA     Location: [place] [address]     Anesthesia:  General endotracheal     Estimated blood loss:  100 ml      Complications: None     Drains: Transurethral Gatica catheter     Specimens: Uterus, cervix, right and left fallopian tubes.      Findings: Stage 2 uterovaginal prolapse with associated cystocele, rectocele and enterocele. Bilateral ureteral efflux. Hemostasis upon completion of the case.      Description of the Procedure:  The patient was taken to operating room 4. General anesthesia was obtained without difficulty. The patient was then placed in the dorsal lithotomy position in [facility]. Surgical time out was performed.   She was then prepped and draped in the usual sterile fashion. The Magrina vaginal retractor was utilized and retraction was placed into the vagina.  The cervix was grasped with tenacula a circumferential incision was made around the cervix. The anterior and posterior cul-de-sacs were entered without difficulty. The uterosacral and cardinal ligament complexes, and uterine vascular pedicles were clamped, cut and suture ligated bilaterally. The uterus was delivered and the uteroovarian complexes were clamped and cut, and suture ligated, freeing the specimen. The ovaries appeared  grossly normal and were left in situ. A risk reduction salpingectomy was performed bilaterally. Each fallopian tube was grasped, clamped, cut and suture ligated.  The uterus, cervix and bilateral tubes were sent to pathology.  Hemostasis was confirmed on all pedicles.     Due to the presence of significant apical prolapse and associated enterocele, it was necessary to proceed with suspension of the vaginal apex and enterocele repair. The bowel was packed away. The uterosacral ligaments were identified. A Omer culdoplasty was performed first. Utilizing 1-0 Vicryl suture, a Omer stitch was placed through the posterior vaginal wall, into the distal left uterosacral ligament, across the posterior cul-de-sac peritoneum, through the distal right uterosacral ligament and back through the posterior vagina in order to repair the enterocele. This suture was held in tension. Following this, two uterosacral suspension stitches were then placed using 1-0 Vicryl sutures on each side of the pelvis. These were placed high on the uterosacral ligaments at and just proximal to the ischial spine. Once these sutures were placed and held in tension, cystoscopy was performed with a 70-degree scope. The patient was given 5 ml of indigo dye intravenously.  Cystoscopy confirmed that there had been no injury to the bladder. There was noted to be prompt flow of blue-stained urine from both ureteral orifices, confirming ureteral patency.      Following this, Allis clamps were used to grasp the anterior vaginal epithelium in the midline and a midline incision was made after infiltration of 0.25% Marcaine with epinephrine.  The redundant epithelium was dissected from the underlying endopelvic connective tissue of the bladder and 0 Vicryl sutures were then used to plicate the endopelvic connective tissue, obliterating the cystocele. The excess vaginal epithelium was excised and the midline incision was then closed with 2-0 Vicryl suture in  running locking fashion.      The lap sponge was removed from the peritoneal cavity and the vaginal apex was then closed with 2-0 Vicryl suture in a running locking fashion. The uterosacral and Omer sutures were then tied down resulting in excellent elevation of the vaginal apex and correction of the enterocele.      Allis clamps were then used to grasp the vaginal epithelium suburethrally and a scalpel was used to make a separate 2 cm midline incision after infiltration of 0.25% Marcaine with epinephrine. The vaginal epithelium was dissected off the underlying periurethral tissue. The Advantage fit trocar was passed from the vaginal side to the retropubic side bilaterally. Exit markings had been made on the skin previously. Cystoscopy was repeated. Bilateral ureteral patency was confirmed once again. There was no evidence of intravesical lesions, sling bladder injury or any other abnormalities. The sling was then placed in the midurethra in a tension free fashion.  There was no evidence of tunneling of the vaginal mucosa.  The excess sling was trimmed, and the vaginal mucosa was closed in the midline with running 2-0 Vicryl suture. Skin incisions were reapproximated with skin glue.      Attention was then directed to the posterior compartment. The posterior vaginal epithelium was excised after infiltration with Marcaine and epinephrine. Then utilizing interrupted No. 0 Vicryl; the endopelvic fibromuscular tissue was plicated in the midline in the standard fashion. The vaginal epithelium was reaproximated with 2-0 Vicryl in a running fashion. She had a well-supported perineum. Inspection at this point revealed a well-supported vaginal apex, anterior and posterior compartments, with good hemostasis. Rectal examination confirmed no injury to the rectum.      A transurethral Gatica catheter was left in place. The patient was then removed from the dorsal lithotomy position, awakened and extubated and transferred from  the operating room to the recovery room in stable condition, having tolerated the procedure well. Sponge, lap, & needle counts were correct.      Natasha Gamino MD

## 2024-07-23 VITALS
HEART RATE: 52 BPM | SYSTOLIC BLOOD PRESSURE: 122 MMHG | OXYGEN SATURATION: 100 % | RESPIRATION RATE: 18 BRPM | DIASTOLIC BLOOD PRESSURE: 62 MMHG | HEIGHT: 64 IN | TEMPERATURE: 98 F | WEIGHT: 140.19 LBS | BODY MASS INDEX: 23.93 KG/M2

## 2024-07-23 LAB
ANION GAP SERPL CALC-SCNC: 3 MMOL/L (ref 0–18)
BUN BLD-MCNC: 7 MG/DL (ref 9–23)
CALCIUM BLD-MCNC: 8.4 MG/DL (ref 8.7–10.4)
CHLORIDE SERPL-SCNC: 110 MMOL/L (ref 98–112)
CO2 SERPL-SCNC: 26 MMOL/L (ref 21–32)
CREAT BLD-MCNC: 0.65 MG/DL
EGFRCR SERPLBLD CKD-EPI 2021: 111 ML/MIN/1.73M2 (ref 60–?)
ERYTHROCYTE [DISTWIDTH] IN BLOOD BY AUTOMATED COUNT: 12.1 %
GLUCOSE BLD-MCNC: 96 MG/DL (ref 70–99)
HCT VFR BLD AUTO: 31.6 %
HGB BLD-MCNC: 10.9 G/DL
MCH RBC QN AUTO: 31.1 PG (ref 26–34)
MCHC RBC AUTO-ENTMCNC: 34.5 G/DL (ref 31–37)
MCV RBC AUTO: 90.3 FL
OSMOLALITY SERPL CALC.SUM OF ELEC: 286 MOSM/KG (ref 275–295)
PLATELET # BLD AUTO: 143 10(3)UL (ref 150–450)
POTASSIUM SERPL-SCNC: 3.6 MMOL/L (ref 3.5–5.1)
RBC # BLD AUTO: 3.5 X10(6)UL
SODIUM SERPL-SCNC: 139 MMOL/L (ref 136–145)
WBC # BLD AUTO: 9.9 X10(3) UL (ref 4–11)

## 2024-07-23 PROCEDURE — 85027 COMPLETE CBC AUTOMATED: CPT | Performed by: OBSTETRICS & GYNECOLOGY

## 2024-07-23 PROCEDURE — 80048 BASIC METABOLIC PNL TOTAL CA: CPT | Performed by: OBSTETRICS & GYNECOLOGY

## 2024-07-23 RX ORDER — IBUPROFEN 600 MG/1
600 TABLET ORAL EVERY 6 HOURS PRN
Status: DISCONTINUED | OUTPATIENT
Start: 2024-07-23 | End: 2024-07-23

## 2024-07-23 RX ORDER — IBUPROFEN 600 MG/1
600 TABLET ORAL EVERY 6 HOURS PRN
Qty: 30 TABLET | Refills: 1 | Status: SHIPPED | OUTPATIENT
Start: 2024-07-23

## 2024-07-23 RX ORDER — TRAMADOL HYDROCHLORIDE 50 MG/1
50 TABLET ORAL EVERY 6 HOURS PRN
Qty: 20 TABLET | Refills: 0 | Status: SHIPPED | OUTPATIENT
Start: 2024-07-23 | End: 2024-07-23

## 2024-07-23 RX ORDER — HYDROCODONE BITARTRATE AND ACETAMINOPHEN 5; 325 MG/1; MG/1
1-2 TABLET ORAL EVERY 6 HOURS PRN
Qty: 20 TABLET | Refills: 0 | Status: SHIPPED | OUTPATIENT
Start: 2024-07-23

## 2024-07-23 RX ORDER — TRAMADOL HYDROCHLORIDE 50 MG/1
50 TABLET ORAL EVERY 6 HOURS PRN
Qty: 20 TABLET | Refills: 0 | Status: SHIPPED | OUTPATIENT
Start: 2024-07-23

## 2024-07-23 RX ORDER — IBUPROFEN 600 MG/1
600 TABLET ORAL EVERY 6 HOURS PRN
Qty: 30 TABLET | Refills: 1 | Status: SHIPPED | OUTPATIENT
Start: 2024-07-23 | End: 2024-07-23

## 2024-07-23 NOTE — DISCHARGE PLANNING
NURSING DISCHARGE NOTE    Discharged Home via Wheelchair.  Accompanied by Support staff  Belongings Taken by patient/family.      Discharge order in place. Pt was cleared for discharge by Dr. Urbina. Discharge education, medications, and follow-up reviewed with pt.  All questions answered at the time of discharge. IV removed. [facility] fill scripts for Norco, tramadol, and ibuprofen. Deng catheter in place. Pt verbalized understanding deng care.

## 2024-07-23 NOTE — PLAN OF CARE
Patient alert, interactive and cooperative with care. She denies chest pain and shortness of breath.   No signs of distress noted. She is tolerating her ordered diet.  Her deng is intact.  Deng care and pericare instruction done. She has been walking in the halls independently. Due meds given.  PRN pain meds and anti emetics available. Use of incentive spirometry encouraged. Follow up labs ordered for tomorrow.  Falls and safety precautions maintained.  Problem: PAIN - ADULT  Goal: Verbalizes/displays adequate comfort level or patient's stated pain goal  Description: INTERVENTIONS:  - Encourage pt to monitor pain and request assistance  - Assess pain using appropriate pain scale  - Administer analgesics based on type and severity of pain and evaluate response  - Implement non-pharmacological measures as appropriate and evaluate response  - Consider cultural and social influences on pain and pain management  - Manage/alleviate anxiety  - Utilize distraction and/or relaxation techniques  - Monitor for opioid side effects  - Notify MD/LIP if interventions unsuccessful or patient reports new pain  - Anticipate increased pain with activity and pre-medicate as appropriate  Outcome: Progressing     Problem: SKIN/TISSUE INTEGRITY - ADULT  Goal: Incision(s), wounds(s) or drain site(s) healing without S/S of infection  Description: INTERVENTIONS:  - Assess and document incision and surrounding tissue  Outcome: Progressing     Problem: GENITOURINARY - ADULT  Goal: Absence of urinary retention  Description: INTERVENTIONS:  - Assess patient’s ability to void and empty bladder  - Monitor intake/output and perform bladder scan as needed  - Follow urinary retention protocol/standard of care  - Consider collaborating with pharmacy to review patient's medication profile  - Implement strategies to promote bladder emptying  Outcome: Progressing     Problem: Patient/Family Goals  Goal: Patient/Family Long Term Goal  Description:  Description: Patient's Long Term Goal: \"stay healthy at home\"    Interventions:  -take medications as prescribed  -attend follow up appointments as recommended  -diet as advised  -report new or worsening symptoms to physician    Outcome: Progressing  Goal: Patient/Family Short Term Goal  Description: Patient's Short Term Goal:     7/22 NOC \" good pain control\"    Interventions:   - monitor for complaints of pain  -offer PRN pain meds  Outcome: Progressing

## 2024-07-23 NOTE — CM/SW NOTE
Noted pt medically cleared for DC. Per chart review, plan for DC w/ deng catheter. Deng catheter discharge education added to pt's DC instructions.    CM/SW will remain available for DC planning and/or support.     SYMONE LeyvaN, CMSRN    [id number]

## 2024-07-23 NOTE — PLAN OF CARE
Assumed pt care this morning at 0730.   Pt is A&Ox4.  On room air, lungs sounds are clear. VSS.  No tele.   Denies having pain.   Plan: discharge today.   Gatica catheter in place. Yellow output.   Serosanguineous vaginal discharge. Sutures in place.   R hand PIV saline lock.  SCD's in place.   Bed in lowest position, call light within reach.     Problem: Patient/Family Goals  Goal: Patient/Family Long Term Goal  Description: Description: Patient's Long Term Goal: \"stay healthy at home\"    Interventions:  -take medications as prescribed  -attend follow up appointments as recommended  -diet as advised  -report new or worsening symptoms to physician    Outcome: Progressing  Goal: Patient/Family Short Term Goal  Description: Patient's Short Term Goal:     7/22 NOC \" good pain control\"    Interventions:   - monitor for complaints of pain  -offer PRN pain meds  Outcome: Progressing     Problem: SKIN/TISSUE INTEGRITY - ADULT  Goal: Incision(s), wounds(s) or drain site(s) healing without S/S of infection  Description: INTERVENTIONS:  - Assess and document incision and surrounding tissue  Outcome: Progressing     Problem: GENITOURINARY - ADULT  Goal: Absence of urinary retention  Description: INTERVENTIONS:  - Assess patient’s ability to void and empty bladder  - Monitor intake/output and perform bladder scan as needed  - Follow urinary retention protocol/standard of care  - Consider collaborating with pharmacy to review patient's medication profile  - Implement strategies to promote bladder emptying  Outcome: Progressing     Problem: PAIN - ADULT  Goal: Verbalizes/displays adequate comfort level or patient's stated pain goal  Description: INTERVENTIONS:  - Encourage pt to monitor pain and request assistance  - Assess pain using appropriate pain scale  - Administer analgesics based on type and severity of pain and evaluate response  - Implement non-pharmacological measures as appropriate and evaluate response  - Consider  cultural and social influences on pain and pain management  - Manage/alleviate anxiety  - Utilize distraction and/or relaxation techniques  - Monitor for opioid side effects  - Notify MD/LIP if interventions unsuccessful or patient reports new pain  - Anticipate increased pain with activity and pre-medicate as appropriate  Outcome: Progressing

## 2024-07-23 NOTE — PROGRESS NOTES
Pain controlled, tolerating general diet  Abd soft, NT  urine clear in Gatica    Hgb:  10.9    POD 1 - doing well    PLAN -  Gatica/leg bag teaching  Home today  F/U 1 week for catheter removal  D/C instructions reviewed

## 2024-07-29 ENCOUNTER — OFFICE VISIT (OUTPATIENT)
Dept: UROLOGY | Facility: CLINIC | Age: 44
End: 2024-07-29
Attending: OBSTETRICS & GYNECOLOGY
Payer: COMMERCIAL

## 2024-07-29 VITALS
BODY MASS INDEX: 23.9 KG/M2 | WEIGHT: 140 LBS | HEIGHT: 64 IN | TEMPERATURE: 98 F | DIASTOLIC BLOOD PRESSURE: 52 MMHG | SYSTOLIC BLOOD PRESSURE: 100 MMHG

## 2024-07-29 DIAGNOSIS — N99.89 POSTOPERATIVE URINARY RETENTION: Primary | ICD-10-CM

## 2024-07-29 DIAGNOSIS — R33.8 POSTOPERATIVE URINARY RETENTION: Primary | ICD-10-CM

## 2024-07-29 PROCEDURE — 99212 OFFICE O/P EST SF 10 MIN: CPT

## 2024-07-29 NOTE — PROCEDURES
.S:   Patient here for voiding trial following surgery  Procedure Date: 7/22/24  Procedure Name: Anterior/Posterior/Enterocele Repair, Bilateral Salpingectomy, Cystoscopy, Mid-urethral Sling, Uterosacral Ligament Suspension, and Vaginal Hysterectomy  Doing well, no complaints  BMs regular using Benefiber and Miralax and stool softener.   Pain well controlled using Ibuprofen and Norco   Denies s/sx of UTI   Tolerates ambulation & diet well     O:   Vitals:    07/29/24 0907   BP: 100/52   Temp: 98.4 °F (36.9 °C)     Gen: NAD  Pulm: nl effort  : No active bleeding.   Discharge scant  Surgical sites-WNL  Deng intact and draining clear yellow urine.    275ml sterile water was instilled into bladder per gravity, via deng per protocol.  Catheter removed per protocol.  Voided 300mL.   Patient  tolerated procedure well.      A:   S/p: Anterior/Posterior/Enterocele Repair, Bilateral Salpingectomy, Cystoscopy, Mid-urethral Sling, Uterosacral Ligament Suspension, and Vaginal Hysterectomy  Encounter Diagnosis   Name Primary?    Postoperative urinary retention Yes     resolved/not resolved  Patient passes voiding trial.     P:  Instructed to call in early afternoon for voiding update.  Reviewed post op restrictions and bowel management  Reviewed signs and sx of urinary retention and UTI.  Discussed return to work/activity plans  Follow-up with Dr. Natasha Gamino in 5 weeks.   All questions answered  She understands and agrees to plan

## 2024-07-29 NOTE — PATIENT INSTRUCTIONS
Voiding Trial Instructions  You have passed your voiding trial at 9:30am.  Please make sure you are drinking some water today.  You can take your Motrin to help with any swelling from the catheter.  It is important to try and empty your bladder every two hours during the day.  Try and empty again at 11:30am.  If you are unable to empty, try and drink a glass of water and try again 30-60 minutes later.  If you have been unable to empty your bladder by 1:30pm, which is 4 hours after leaving the office, you will most likely be uncomfortable and you will need to come back into the office.  If it is after 4pm, go to an urgent care or emergency room to have a catheter placed again.      Please call our office at [phone] by 1:30pm with a voiding update and  if you have any questions or concerns.

## 2024-08-30 ENCOUNTER — TELEPHONE (OUTPATIENT)
Dept: UROLOGY | Facility: CLINIC | Age: 44
End: 2024-08-30

## 2024-08-30 NOTE — TELEPHONE ENCOUNTER
CECI yesterday, 8/29/24 for patient to call us back.  She cancelled her 6 week post op. Asked her to call us to reschedule it.

## (undated) DEVICE — #15 STERILE STAINLESS BLADE: Brand: STERILE STAINLESS BLADES

## (undated) DEVICE — SUT COAT VCRL 1 27IN CT-1 ABSRB VLT 36MM 1/2

## (undated) DEVICE — SOLUTION IRRIG 1000ML 0.9% NACL USP BTL

## (undated) DEVICE — STERILE H2O FOR IRRIG .

## (undated) DEVICE — SUT COAT VCRL+ 0 27IN CT-1 ABSRB VLT ANTIBACT

## (undated) DEVICE — SYRINGE BULB 50/CS 48/PLT: Brand: MEDEGEN MEDICAL PRODUCTS, LLC

## (undated) DEVICE — ZZ-DISC-SUB-448815-SUT VCRL 0 L27IN ABSRB VLT L36MM CT-1 1/2

## (undated) DEVICE — PREMIUM WET SKIN PREP TRAY: Brand: MEDLINE INDUSTRIES, INC.

## (undated) DEVICE — 3M™ TEGADERM™ TRANSPARENT FILM DRESSING FRAME STYLE, 1624W, 2-3/8 IN X 2-3/4 IN (6 CM X 7 CM), 100/CT 4CT/CASE: Brand: 3M™ TEGADERM™

## (undated) DEVICE — PACK GYNE CUSTOM

## (undated) DEVICE — PENCIL SMK EVAC L10FT MPLR BLDE JAW OPN

## (undated) DEVICE — SUT VCRL 0 18IN CT-1 ABSRB VLT CR L36MM 1/2

## (undated) DEVICE — MEDI-VAC NON-CONDUCTIVE SUCTION TUBING: Brand: CARDINAL HEALTH

## (undated) DEVICE — HOOK RETRCT 5MM SHRP E STAY DISP LONE STAR

## (undated) DEVICE — STANDARD HYPODERMIC NEEDLE,POLYPROPYLENE HUB: Brand: MONOJECT

## (undated) DEVICE — SUT COAT VCRL 2-0 27IN ABSRB VLT 36MM CT-1

## (undated) DEVICE — #10 STERILE BLADE: Brand: POLYMER COATED BLADES

## (undated) DEVICE — SET CYSTO IRRIG L77IN DIA0.241IN BLDR NVENT

## (undated) DEVICE — GLOVE SUR 6.5 SENSICARE PI PIP CRM PWD F

## (undated) DEVICE — LAPAROTOMY SPONGE - RF AND X-RAY DETECTABLE PRE-WASHED: Brand: SITUATE

## (undated) DEVICE — SUT COAT VCRL 2-0 27IN ABSRB UD 26MM CT-2

## (undated) DEVICE — SLEEVE COMPR MD KNEE LEN SGL USE KENDALL SCD